# Patient Record
Sex: MALE | Race: WHITE | NOT HISPANIC OR LATINO | URBAN - METROPOLITAN AREA
[De-identification: names, ages, dates, MRNs, and addresses within clinical notes are randomized per-mention and may not be internally consistent; named-entity substitution may affect disease eponyms.]

---

## 2018-03-22 ENCOUNTER — INPATIENT (INPATIENT)
Facility: HOSPITAL | Age: 53
LOS: 1 days | Discharge: HOME | End: 2018-03-24
Attending: INTERNAL MEDICINE | Admitting: INTERNAL MEDICINE

## 2018-03-22 VITALS
TEMPERATURE: 99 F | DIASTOLIC BLOOD PRESSURE: 87 MMHG | OXYGEN SATURATION: 100 % | SYSTOLIC BLOOD PRESSURE: 147 MMHG | RESPIRATION RATE: 18 BRPM | HEART RATE: 87 BPM

## 2018-03-23 LAB
ALBUMIN SERPL ELPH-MCNC: 4.7 G/DL — SIGNIFICANT CHANGE UP (ref 3.5–5.2)
ALP SERPL-CCNC: 48 U/L — SIGNIFICANT CHANGE UP (ref 30–115)
ALT FLD-CCNC: 29 U/L — SIGNIFICANT CHANGE UP (ref 0–41)
ANION GAP SERPL CALC-SCNC: 12 MMOL/L — SIGNIFICANT CHANGE UP (ref 7–14)
ANION GAP SERPL CALC-SCNC: 19 MMOL/L — HIGH (ref 7–14)
APPEARANCE UR: CLEAR — SIGNIFICANT CHANGE UP
APTT BLD: 26.4 SEC — LOW (ref 27–39.2)
AST SERPL-CCNC: 20 U/L — SIGNIFICANT CHANGE UP (ref 0–41)
BASE EXCESS BLDV CALC-SCNC: 4 MMOL/L — HIGH (ref -2–2)
BASOPHILS # BLD AUTO: 0.01 K/UL — SIGNIFICANT CHANGE UP (ref 0–0.2)
BASOPHILS # BLD AUTO: 0.03 K/UL — SIGNIFICANT CHANGE UP (ref 0–0.2)
BASOPHILS NFR BLD AUTO: 0.1 % — SIGNIFICANT CHANGE UP (ref 0–1)
BASOPHILS NFR BLD AUTO: 0.1 % — SIGNIFICANT CHANGE UP (ref 0–1)
BILIRUB SERPL-MCNC: 0.9 MG/DL — SIGNIFICANT CHANGE UP (ref 0.2–1.2)
BILIRUB UR-MCNC: NEGATIVE — SIGNIFICANT CHANGE UP
BUN SERPL-MCNC: 18 MG/DL — SIGNIFICANT CHANGE UP (ref 10–20)
BUN SERPL-MCNC: 25 MG/DL — HIGH (ref 10–20)
CA-I SERPL-SCNC: 1.22 MMOL/L — SIGNIFICANT CHANGE UP (ref 1.12–1.3)
CALCIUM SERPL-MCNC: 8.5 MG/DL — SIGNIFICANT CHANGE UP (ref 8.5–10.1)
CALCIUM SERPL-MCNC: 9.1 MG/DL — SIGNIFICANT CHANGE UP (ref 8.5–10.1)
CHLORIDE SERPL-SCNC: 100 MMOL/L — SIGNIFICANT CHANGE UP (ref 98–110)
CHLORIDE SERPL-SCNC: 101 MMOL/L — SIGNIFICANT CHANGE UP (ref 98–110)
CK MB BLD-MCNC: 4 % — SIGNIFICANT CHANGE UP (ref 0–4)
CK MB CFR SERPL CALC: 1.8 NG/ML — SIGNIFICANT CHANGE UP (ref 0.6–6.3)
CK SERPL-CCNC: 48 U/L — SIGNIFICANT CHANGE UP (ref 0–225)
CO2 SERPL-SCNC: 24 MMOL/L — SIGNIFICANT CHANGE UP (ref 17–32)
CO2 SERPL-SCNC: 27 MMOL/L — SIGNIFICANT CHANGE UP (ref 17–32)
COLOR SPEC: YELLOW — SIGNIFICANT CHANGE UP
CREAT SERPL-MCNC: 1 MG/DL — SIGNIFICANT CHANGE UP (ref 0.7–1.5)
CREAT SERPL-MCNC: 1.1 MG/DL — SIGNIFICANT CHANGE UP (ref 0.7–1.5)
DIFF PNL FLD: NEGATIVE — SIGNIFICANT CHANGE UP
EOSINOPHIL # BLD AUTO: 0.08 K/UL — SIGNIFICANT CHANGE UP (ref 0–0.7)
EOSINOPHIL # BLD AUTO: 0.21 K/UL — SIGNIFICANT CHANGE UP (ref 0–0.7)
EOSINOPHIL NFR BLD AUTO: 0.4 % — SIGNIFICANT CHANGE UP (ref 0–8)
EOSINOPHIL NFR BLD AUTO: 1.4 % — SIGNIFICANT CHANGE UP (ref 0–8)
GAS PNL BLDV: 141 MMOL/L — SIGNIFICANT CHANGE UP (ref 136–145)
GAS PNL BLDV: SIGNIFICANT CHANGE UP
GLUCOSE SERPL-MCNC: 80 MG/DL — SIGNIFICANT CHANGE UP (ref 70–110)
GLUCOSE SERPL-MCNC: 96 MG/DL — SIGNIFICANT CHANGE UP (ref 70–99)
GLUCOSE UR QL: NEGATIVE — SIGNIFICANT CHANGE UP
HCO3 BLDV-SCNC: 30 MMOL/L — HIGH (ref 22–29)
HCT VFR BLD CALC: 39.3 % — LOW (ref 42–52)
HCT VFR BLD CALC: 41.9 % — LOW (ref 42–52)
HCT VFR BLDA CALC: 42.9 % — SIGNIFICANT CHANGE UP (ref 34–44)
HGB BLD CALC-MCNC: 14 G/DL — SIGNIFICANT CHANGE UP (ref 14–18)
HGB BLD-MCNC: 12.5 G/DL — LOW (ref 14–18)
HGB BLD-MCNC: 13.3 G/DL — LOW (ref 14–18)
IMM GRANULOCYTES NFR BLD AUTO: 0.6 % — HIGH (ref 0.1–0.3)
IMM GRANULOCYTES NFR BLD AUTO: 0.7 % — HIGH (ref 0.1–0.3)
INR BLD: 1.24 RATIO — SIGNIFICANT CHANGE UP (ref 0.65–1.3)
KETONES UR-MCNC: NEGATIVE — SIGNIFICANT CHANGE UP
LACTATE BLDV-MCNC: 0.8 MMOL/L — SIGNIFICANT CHANGE UP (ref 0.5–1.6)
LACTATE SERPL-SCNC: 0.8 MMOL/L — SIGNIFICANT CHANGE UP (ref 0.5–2.2)
LACTATE SERPL-SCNC: 1 MMOL/L — SIGNIFICANT CHANGE UP (ref 0.5–2.2)
LEUKOCYTE ESTERASE UR-ACNC: NEGATIVE — SIGNIFICANT CHANGE UP
LYMPHOCYTES # BLD AUTO: 1.6 K/UL — SIGNIFICANT CHANGE UP (ref 1.2–3.4)
LYMPHOCYTES # BLD AUTO: 1.77 K/UL — SIGNIFICANT CHANGE UP (ref 1.2–3.4)
LYMPHOCYTES # BLD AUTO: 10.4 % — LOW (ref 20.5–51.1)
LYMPHOCYTES # BLD AUTO: 7.9 % — LOW (ref 20.5–51.1)
MAGNESIUM SERPL-MCNC: 2 MG/DL — SIGNIFICANT CHANGE UP (ref 1.8–2.4)
MCHC RBC-ENTMCNC: 19.7 PG — LOW (ref 27–31)
MCHC RBC-ENTMCNC: 19.9 PG — LOW (ref 27–31)
MCHC RBC-ENTMCNC: 31.7 G/DL — LOW (ref 32–37)
MCHC RBC-ENTMCNC: 31.8 G/DL — LOW (ref 32–37)
MCV RBC AUTO: 62.1 FL — LOW (ref 80–94)
MCV RBC AUTO: 62.8 FL — LOW (ref 80–94)
MONOCYTES # BLD AUTO: 1.64 K/UL — HIGH (ref 0.1–0.6)
MONOCYTES # BLD AUTO: 1.77 K/UL — HIGH (ref 0.1–0.6)
MONOCYTES NFR BLD AUTO: 10.7 % — HIGH (ref 1.7–9.3)
MONOCYTES NFR BLD AUTO: 7.9 % — SIGNIFICANT CHANGE UP (ref 1.7–9.3)
NEUTROPHILS # BLD AUTO: 11.8 K/UL — HIGH (ref 1.4–6.5)
NEUTROPHILS # BLD AUTO: 18.72 K/UL — HIGH (ref 1.4–6.5)
NEUTROPHILS NFR BLD AUTO: 76.7 % — HIGH (ref 42.2–75.2)
NEUTROPHILS NFR BLD AUTO: 83.1 % — HIGH (ref 42.2–75.2)
NITRITE UR-MCNC: NEGATIVE — SIGNIFICANT CHANGE UP
NRBC # BLD: 0 /100 WBCS — SIGNIFICANT CHANGE UP (ref 0–0)
PCO2 BLDV: 53 MMHG — HIGH (ref 41–51)
PH BLDV: 7.37 — SIGNIFICANT CHANGE UP (ref 7.26–7.43)
PH UR: 6 — SIGNIFICANT CHANGE UP (ref 5–8)
PLATELET # BLD AUTO: 285 K/UL — SIGNIFICANT CHANGE UP (ref 130–400)
PLATELET # BLD AUTO: 320 K/UL — SIGNIFICANT CHANGE UP (ref 130–400)
PO2 BLDV: 18 MMHG — LOW (ref 20–40)
POTASSIUM BLDV-SCNC: 3.8 MMOL/L — SIGNIFICANT CHANGE UP (ref 3.3–5.6)
POTASSIUM SERPL-MCNC: 3.7 MMOL/L — SIGNIFICANT CHANGE UP (ref 3.5–5)
POTASSIUM SERPL-MCNC: 4.1 MMOL/L — SIGNIFICANT CHANGE UP (ref 3.5–5)
POTASSIUM SERPL-SCNC: 3.7 MMOL/L — SIGNIFICANT CHANGE UP (ref 3.5–5)
POTASSIUM SERPL-SCNC: 4.1 MMOL/L — SIGNIFICANT CHANGE UP (ref 3.5–5)
PROT SERPL-MCNC: 7.1 G/DL — SIGNIFICANT CHANGE UP (ref 6–8)
PROT UR-MCNC: NEGATIVE — SIGNIFICANT CHANGE UP
PROTHROM AB SERPL-ACNC: 13.5 SEC — HIGH (ref 9.95–12.87)
RBC # BLD: 6.33 M/UL — HIGH (ref 4.7–6.1)
RBC # BLD: 6.67 M/UL — HIGH (ref 4.7–6.1)
RBC # FLD: 17.5 % — HIGH (ref 11.5–14.5)
RBC # FLD: 18.6 % — HIGH (ref 11.5–14.5)
SAO2 % BLDV: 22 % — SIGNIFICANT CHANGE UP
SODIUM SERPL-SCNC: 140 MMOL/L — SIGNIFICANT CHANGE UP (ref 135–146)
SODIUM SERPL-SCNC: 143 MMOL/L — SIGNIFICANT CHANGE UP (ref 135–146)
SP GR SPEC: 1.01 — SIGNIFICANT CHANGE UP (ref 1.01–1.03)
TROPONIN T SERPL-MCNC: <0.01 NG/ML — SIGNIFICANT CHANGE UP
UROBILINOGEN FLD QL: 0.2 — SIGNIFICANT CHANGE UP (ref 0.2–0.2)
WBC # BLD: 15.36 K/UL — HIGH (ref 4.8–10.8)
WBC # BLD: 22.51 K/UL — HIGH (ref 4.8–10.8)
WBC # FLD AUTO: 15.36 K/UL — HIGH (ref 4.8–10.8)
WBC # FLD AUTO: 22.51 K/UL — HIGH (ref 4.8–10.8)

## 2018-03-23 RX ORDER — ACETAMINOPHEN 500 MG
650 TABLET ORAL EVERY 6 HOURS
Qty: 0 | Refills: 0 | Status: DISCONTINUED | OUTPATIENT
Start: 2018-03-23 | End: 2018-03-24

## 2018-03-23 RX ORDER — HYDROCORTISONE 1 %
1 OINTMENT (GRAM) TOPICAL
Qty: 0 | Refills: 0 | Status: DISCONTINUED | OUTPATIENT
Start: 2018-03-23 | End: 2018-03-24

## 2018-03-23 RX ORDER — SIMETHICONE 80 MG/1
80 TABLET, CHEWABLE ORAL EVERY 8 HOURS
Qty: 0 | Refills: 0 | Status: DISCONTINUED | OUTPATIENT
Start: 2018-03-23 | End: 2018-03-24

## 2018-03-23 RX ORDER — CIPROFLOXACIN LACTATE 400MG/40ML
400 VIAL (ML) INTRAVENOUS EVERY 12 HOURS
Qty: 0 | Refills: 0 | Status: DISCONTINUED | OUTPATIENT
Start: 2018-03-23 | End: 2018-03-24

## 2018-03-23 RX ORDER — METOPROLOL TARTRATE 50 MG
25 TABLET ORAL
Qty: 0 | Refills: 0 | Status: DISCONTINUED | OUTPATIENT
Start: 2018-03-23 | End: 2018-03-24

## 2018-03-23 RX ORDER — SIMETHICONE 80 MG/1
80 TABLET, CHEWABLE ORAL ONCE
Qty: 0 | Refills: 0 | Status: COMPLETED | OUTPATIENT
Start: 2018-03-23 | End: 2018-03-23

## 2018-03-23 RX ORDER — SODIUM CHLORIDE 9 MG/ML
1000 INJECTION INTRAMUSCULAR; INTRAVENOUS; SUBCUTANEOUS ONCE
Qty: 0 | Refills: 0 | Status: COMPLETED | OUTPATIENT
Start: 2018-03-23 | End: 2018-03-23

## 2018-03-23 RX ORDER — NEBIVOLOL HYDROCHLORIDE 5 MG/1
1 TABLET ORAL
Qty: 0 | Refills: 0 | COMMUNITY

## 2018-03-23 RX ORDER — ENOXAPARIN SODIUM 100 MG/ML
40 INJECTION SUBCUTANEOUS DAILY
Qty: 0 | Refills: 0 | Status: DISCONTINUED | OUTPATIENT
Start: 2018-03-23 | End: 2018-03-24

## 2018-03-23 RX ORDER — CIPROFLOXACIN LACTATE 400MG/40ML
400 VIAL (ML) INTRAVENOUS ONCE
Qty: 0 | Refills: 0 | Status: COMPLETED | OUTPATIENT
Start: 2018-03-23 | End: 2018-03-23

## 2018-03-23 RX ORDER — METRONIDAZOLE 500 MG
500 TABLET ORAL ONCE
Qty: 0 | Refills: 0 | Status: COMPLETED | OUTPATIENT
Start: 2018-03-23 | End: 2018-03-23

## 2018-03-23 RX ORDER — METRONIDAZOLE 500 MG
500 TABLET ORAL EVERY 8 HOURS
Qty: 0 | Refills: 0 | Status: DISCONTINUED | OUTPATIENT
Start: 2018-03-23 | End: 2018-03-24

## 2018-03-23 RX ADMIN — Medication 650 MILLIGRAM(S): at 13:49

## 2018-03-23 RX ADMIN — SIMETHICONE 80 MILLIGRAM(S): 80 TABLET, CHEWABLE ORAL at 04:40

## 2018-03-23 RX ADMIN — SIMETHICONE 80 MILLIGRAM(S): 80 TABLET, CHEWABLE ORAL at 11:10

## 2018-03-23 RX ADMIN — Medication 25 MILLIGRAM(S): at 17:23

## 2018-03-23 RX ADMIN — SODIUM CHLORIDE 1000 MILLILITER(S): 9 INJECTION INTRAMUSCULAR; INTRAVENOUS; SUBCUTANEOUS at 00:20

## 2018-03-23 RX ADMIN — Medication 100 MILLIGRAM(S): at 22:10

## 2018-03-23 RX ADMIN — SIMETHICONE 80 MILLIGRAM(S): 80 TABLET, CHEWABLE ORAL at 22:40

## 2018-03-23 RX ADMIN — Medication 200 MILLIGRAM(S): at 04:25

## 2018-03-23 RX ADMIN — Medication 100 MILLIGRAM(S): at 13:39

## 2018-03-23 RX ADMIN — Medication 200 MILLIGRAM(S): at 17:23

## 2018-03-23 RX ADMIN — Medication 1 APPLICATION(S): at 17:20

## 2018-03-23 RX ADMIN — Medication 100 MILLIGRAM(S): at 05:55

## 2018-03-23 RX ADMIN — ENOXAPARIN SODIUM 40 MILLIGRAM(S): 100 INJECTION SUBCUTANEOUS at 12:26

## 2018-03-23 RX ADMIN — SODIUM CHLORIDE 1000 MILLILITER(S): 9 INJECTION INTRAMUSCULAR; INTRAVENOUS; SUBCUTANEOUS at 02:55

## 2018-03-23 NOTE — CONSULT NOTE ADULT - ASSESSMENT
HTN- well controlled  acute uncomplicated diverticulitis  leukocytosis from steroids? vs diverticulitis    plan:    admit to dr ramirez  ivf  cipro / flagyl  off prednisone  cont clears and advance as tolerated  may resume bystolic  GI eval  dc home with po abx once tolerating reg diet (about 24-48 hours)

## 2018-03-23 NOTE — H&P ADULT - NSHPPHYSICALEXAM_GEN_ALL_CORE
T(C): 37 (03-22-18 @ 22:57), Max: 37 (03-22-18 @ 22:57)  HR: 87 (03-22-18 @ 22:57) (87 - 87)  BP: 147/87 (03-22-18 @ 22:57) (147/87 - 147/87)  RR: 18 (03-22-18 @ 22:57) (18 - 18)  SpO2: 100% (03-22-18 @ 22:57) (100% - 100%)    PHYSICAL EXAM:  GENERAL: NAD, well-developed  HEAD:  Atraumatic, Normocephalic  EYES: EOMI, PERRLA, conjunctiva and sclera clear  NECK: Supple, No JVD  CHEST/LUNG: Clear to auscultation bilaterally; No wheeze  HEART: Regular rate and rhythm; No murmurs, rubs, or gallops  ABDOMEN: Soft, LLQ tender to light palpation, no guarding/rebound, nondistended; Bowel sounds present  EXTREMITIES:  2+ Peripheral Pulses, No clubbing, cyanosis, or edema  PSYCH: AAOx3  NEUROLOGY: non-focal  SKIN: No rashes or lesions

## 2018-03-23 NOTE — H&P ADULT - NSHPLABSRESULTS_GEN_ALL_CORE
Labs:                         13.3<L>  22.51<H>   )-----------(   320      ( 23 Mar 2018 00:05 )              41.9<L>    Neutro%  83.1<H>   Lympho%  7.9<L>   Mono%    7.9     Bands    x            140  |  101  |  25<H>  ----------------------------<  80  4.1   |  27  |  1.1    Ca    9.1      23 Mar 2018 00:05    TPro  7.1  /  Alb  4.7  /  TBili  0.9  /  DBili  x   /  AST  20  /  ALT  29  /  AlkPhos  48      PT/INR - ( 23 Mar 2018 00:05 )   PT: 13.50 sec;   INR: 1.24 ratio    PTT - ( 23 Mar 2018 00:05 )  PTT:26.4 sec    CARDIAC MARKERS ( 23 Mar 2018 00:05 )  x     / <0.01 ng/mL / 48 U/L / x     / 1.8 ng/mL      LIVER FUNCTIONS - ( 23 Mar 2018 00:05 )  Alb: 4.7 g/dL / Pro: 7.1 g/dL / ALK PHOS: 48 U/L / ALT: 29 U/L / AST: 20 U/L / GGT: x           Lactate, Blood: 1.0 mmol/L (18 @ 02:16)    Urinalysis Basic - ( 23 Mar 2018 00:05 )  Color: Yellow / Appearance: Clear / S.015 / pH: x  Gluc: x / Ketone: Negative  / Bili: Negative / Urobili: 0.2   Blood: x / Protein: Negative / Nitrite: Negative   Leuk Esterase: Negative / RBC: x / WBC x   Sq Epi: x / Non Sq Epi: x / Bacteria: x    < from: CT Abdomen and Pelvis w/ IV Cont (18 @ 02:46) >    Acute uncomplicated sigmoid diverticulitis.    Splenomegaly, 14.6 cm craniocaudal dimension.    Normal appendix.

## 2018-03-23 NOTE — H&P ADULT - ASSESSMENT
Patient is a 52y old  Male who presents with a chief complaint of LLQ pain found to have uncomplicated diverticulitis  PAST MEDICAL & SURGICAL HISTORY:  Hypertension  No significant past surgical history    Plan:  -iv cipro flagyl till pt tolerates diet  -start with clear liquid diet and advance as tolerated  -monitor closely for worsening pain, unstable vital signs, guarding  -general surgery eval if suspect perforation/complication  -f/u GI outpatient for colonoscopy in 6-8 weeks  -outpatient f/u for incidental finding of splenomegaly  -continue metoprolol instead of bystolic  -lovenox for DVT ppx

## 2018-03-23 NOTE — H&P ADULT - HISTORY OF PRESENT ILLNESS
51 yo M with PMHx of HTN on Bystolic, with complaints of 6 hours of non radiating periumbilical and LLQ abd pain not a/w with nausea/vomiting/diarrhea/eating/movement. Pt had 1 normal BM after onset of pain.  Pt denies fever/chills, chest pain, shortness of breath, headache, diaphoresis, weight loss/gain, LE pain/numbness/parasthesias,  sxs.

## 2018-03-23 NOTE — ED ADULT NURSE NOTE - CHPI ED SYMPTOMS NEG
no abdominal distension/no dysuria/no nausea/no blood in stool/no hematuria/no vomiting/no fever/no diarrhea/no chills/no burning urination

## 2018-03-23 NOTE — CONSULT NOTE ADULT - ATTENDING COMMENTS
agree with above plan but patient was discharged before seen by me/patient was informed by staff to follow up with outpatient gi and if fever or pain worsen return to hospital.

## 2018-03-23 NOTE — ED PROVIDER NOTE - PHYSICAL EXAMINATION
CONSTITUTIONAL: Well-developed; well-nourished; in no acute distress, nontoxic appearing  SKIN: skin exam is warm and dry,  HEAD: Normocephalic; atraumatic.  EYES: PERRL, 3 mm bilateral, no nystagmus, EOM intact; conjunctiva and sclera clear.  ENT: MMM, no nasal congestion  NECK: Supple; non tender.+ full passive ROM in all directions. No JVD  CARD: S1, S2 normal, no murmur  RESP: No wheezes, rales or rhonchi. Good air movement bilaterally  ABD: soft; non-distended; + RLQ ttp w/o guarding or CVAT. No Rebound, No guarding  EXT: Normal ROM. No cyanosis or edema. DP Pulses intact.   NEURO: awake, alert, following commands, oriented, grossly unremarkable. No Focal deficits. GCS 15.   PSYCH: Cooperative, appropriate.

## 2018-03-23 NOTE — PROGRESS NOTE ADULT - SUBJECTIVE AND OBJECTIVE BOX
Medicine admit note    Patient was seen and examined. Spoke with RN and housestaff. Chart reviewed.  No events overnight.  Vital Signs Last 24 Hrs  T(F): 98.6 (22 Mar 2018 22:57), Max: 98.6 (22 Mar 2018 22:57)  HR: 87 (22 Mar 2018 22:57) (87 - 87)  BP: 147/87 (22 Mar 2018 22:57) (147/87 - 147/87)  SpO2: 100% (22 Mar 2018 22:57) (100% - 100%)  MEDICATIONS  (STANDING):    MEDICATIONS  (PRN):    Labs:                        13.3   22.51 )-----------( 320      ( 23 Mar 2018 00:05 )             41.9     23 Mar 2018 00:05    140    |  101    |  25     ----------------------------<  80     4.1     |  27     |  1.1      Ca    9.1        23 Mar 2018 00:05    TPro  7.1    /  Alb  4.7    /  TBili  0.9    /  DBili  x      /  AST  20     /  ALT  29     /  AlkPhos  48     23 Mar 2018 00:05    PT/INR - ( 23 Mar 2018 00:05 )   PT: 13.50 sec;   INR: 1.24 ratio         PTT - ( 23 Mar 2018 00:05 )  PTT:26.4 sec  Urinalysis Basic - ( 23 Mar 2018 00:05 )    Color: Yellow / Appearance: Clear / S.015 / pH: x  Gluc: x / Ketone: Negative  / Bili: Negative / Urobili: 0.2   Blood: x / Protein: Negative / Nitrite: Negative   Leuk Esterase: Negative / RBC: x / WBC x   Sq Epi: x / Non Sq Epi: x / Bacteria: x      CT: acute uncomplicated sigmoid diverticulitis      General: comfortable, NAD  Neurology: A&Ox3, nonfocal  Head:  Normocephalic, atraumatic  ENT:  Mucosa moist, no ulcerations  Neck:  Supple, no JVD,   Skin: no breakdowns (as per RN)  Resp: CTA B/L  CV: RRR, S1S2,   GI: Soft, RLQ tenderness, no rebound, positive bowel sounds  MS: No edema, + peripheral pulses, FROM all 4 extremity      A/P:  51 yo man with RLQ abdominal pain  No fever, no bleeding, no N/VD    IV cipro/flagyl  analgesia  surgery eval if worsens  liquid diet    splenomegaly- follow with PMD Dr Ubaldo MCKEON as outpt  DVT prophylaxis  Decubitus prevention- all measures as per RN protocol  Please call or text me with any questions or updates

## 2018-03-23 NOTE — CONSULT NOTE ADULT - ASSESSMENT
53 yo M with PMHx of HTN on Bystolic, admitted with complain of non radiating periumbilical and LLQ abd pain  and was found to have acute sigmoid uncomplicated diverticulitis.    Acute Sigmoid Uncomplicated Diverticulitis :  - Can start on low residue diet.  - Continue IV cipro and flagyl for now.  - Can change to PO on discharge for total of 14 days.  - High WBC likely secondary to steroids.  - Pt needs outpatient colonoscopy in 8 weeks.  - Risks and benefits discussed with the patient and he wants to follow up with Dr Brody in [place].  - Recall GI as needed.

## 2018-03-23 NOTE — CONSULT NOTE ADULT - SUBJECTIVE AND OBJECTIVE BOX
NEPHROLOGY CONSULTATION NOTE    53 yo wm with pmh of htn on bystolic, recently placed on po sterods for rash, then developed hiccups with abd pain.  No fever.  Ct abd shows diverticulitis in ED.  Steroids dced and abx given.  Pt now tolerating clears.  BP's well controlled on bystolic.  No n/v/fever/diarrhea.  Now with less abd pain.    PAST MEDICAL & SURGICAL HISTORY:  Hypertension  No significant past surgical history    Allergies:  No Known Allergies    Home Medications Reviewed    SOCIAL HISTORY:  Denies ETOH,Smoking,   FAMILY HISTORY:  No pertinent family history in first degree relatives        REVIEW OF SYSTEMS:  CONSTITUTIONAL: No weakness, fevers or chills  EYES/ENT: No visual changes;  No vertigo or throat pain   NECK: No pain or stiffness  RESPIRATORY: No cough, wheezing, hemoptysis; No shortness of breath  CARDIOVASCULAR: No chest pain or palpitations.  GASTROINTESTINAL: as above  GENITOURINARY: No dysuria, frequency, foamy urine, urinary urgency, incontinence or hematuria  NEUROLOGICAL: No numbness or weakness  SKIN: No itching, burning, rashes, or lesions   VASCULAR: No bilateral lower extremity edema.   All other review of systems is negative unless indicated above.    PHYSICAL EXAM:  NAD  Awake and Alert  moist mm   cta bl  'rrr  soft, mild rlq tendreness, + BS  no cvat  no cce  no rash    Hospital Medications:   MEDICATIONS  (STANDING):  ciprofloxacin   IVPB 400 milliGRAM(s) IV Intermittent every 12 hours  enoxaparin Injectable 40 milliGRAM(s) SubCutaneous daily  metoprolol tartrate 25 milliGRAM(s) Oral two times a day  metroNIDAZOLE  IVPB 500 milliGRAM(s) IV Intermittent every 8 hours        VITALS:  T(F): 98.8 (18 @ 09:19), Max: 99.5 (18 @ 08:27)  HR: 89 (18 @ 09:19)  BP: 119/68 (18 @ 09:19)  RR: 18 (18 @ 09:19)  SpO2: 98% (18 @ 08:27)  Wt(kg): --    Height (cm): 187.96 ( 09:19)  Weight (kg): 97.5 (:19)  BMI (kg/m2): 27.6 (03-23 @ 09:19)  BSA (m2): 2.24 ( @ 09:19)    LABS:      140  |  101  |  25<H>  ----------------------------<  80  4.1   |  27  |  1.1    Ca    9.1      23 Mar 2018 00:05    TPro  7.1  /  Alb  4.7  /  TBili  0.9  /  DBili      /  AST  20  /  ALT  29  /  AlkPhos  48                            13.3   22.51 )-----------( 320      ( 23 Mar 2018 00:05 )             41.9       Urine Studies:  Urinalysis Basic - ( 23 Mar 2018 00:05 )    Color: Yellow / Appearance: Clear / S.015 / pH:   Gluc:  / Ketone: Negative  / Bili: Negative / Urobili: 0.2   Blood:  / Protein: Negative / Nitrite: Negative   Leuk Esterase: Negative / RBC:  / WBC    Sq Epi:  / Non Sq Epi:  / Bacteria:           RADIOLOGY & ADDITIONAL STUDIES:

## 2018-03-23 NOTE — ED PROVIDER NOTE - NS ED ROS FT
Constitutional:  no fevers, no chills, no malaise  Eyes:  No visual changes  ENMT: No neck pain or stiffness, no nasal congestion, no ear pain, no throat pain  Cardiac:  No chest pain, + hx of HTN  Respiratory:  No cough or sob  GI:  Ad per HPI  :  No dysuria, frequency or burning.  MS:  No back pain, no joint pain.  Neuro:  No headache, no dizziness, no change in mental status  Skin:  No skin rash

## 2018-03-23 NOTE — CONSULT NOTE ADULT - SUBJECTIVE AND OBJECTIVE BOX
Chief Complaint:  Patient is a 52y old  Male who presents with a chief complaint of LLQ abdominal pain x 1 day (23 Mar 2018 08:17)    HPI: 51 yo M with PMHx of HTN on [facility], admitted with complain of non radiating periumbilical and LLQ abd pain not a/w with nausea/vomiting/diarrhea/eating/movement. Pt had 1 normal BM after onset of pain.  Pt denies fever/chills,  and was recently on prednisone for skin rash and was found to have acute sigmoid uncomplicated diverticulitis and WBC of 22,000.  Pt never had colonoscopy and no family H/o colon CA. Never had diverticulitis in the past.    Home Medications: [facility] Medications:  acetaminophen   Tablet 650 milliGRAM(s) Oral every 6 hours PRN  ciprofloxacin   IVPB 400 milliGRAM(s) IV Intermittent every 12 hours  enoxaparin Injectable 40 milliGRAM(s) SubCutaneous daily  hydrocortisone 1% Cream 1 Application(s) Topical two times a day  metoprolol tartrate 25 milliGRAM(s) Oral two times a day  metroNIDAZOLE  IVPB 500 milliGRAM(s) IV Intermittent every 8 hours  simethicone 80 milliGRAM(s) Chew every 8 hours PRN      PMHX/PSHX:  Hypertension  No significant past surgical history      Family history:  No pertinent family history in first degree relatives      ROS:     General:  No wt loss, fevers, chills, night sweats, fatigue,   Eyes:  Good vision, no reported pain  ENT:  No sore throat, pain, runny nose, dysphagia  CV:  No pain, palpitations, hypo/hypertension  Resp:  No dyspnea, cough, tachypnea, wheezing  GI:  See HPI  :  No pain, bleeding, incontinence, nocturia  Muscle:  No pain, weakness  Neuro:  No weakness, tingling, memory problems  Skin:  No rash, edema      PHYSICAL EXAM:     GENERAL:  Appears stated age, well-groomed, well-nourished, no distress  HEENT:  NC/AT,  conjunctivae clear and pink,  no JVD  CHEST:  Full & symmetric excursion, no increased effort, breath sounds clear  HEART:  Regular rhythm, S1, S2, no added sounds  ABDOMEN:  Soft, non-tender, non-distended, normoactive bowel sounds,  no masses ,  EXTREMITIES:  no cyanosis, clubbing or edema  SKIN:  No rash/erythema/ecchymoses/petechiae/wounds/abscess/warm/dry  NEURO:  Alert, oriented, non focal    Vital Signs:  Vital Signs Last 24 Hrs  T(C): 37.4 (23 Mar 2018 13:00), Max: 37.5 (23 Mar 2018 08:27)  T(F): 99.3 (23 Mar 2018 13:00), Max: 99.5 (23 Mar 2018 08:27)  HR: 92 (23 Mar 2018 13:00) (85 - 92)  BP: 126/78 (23 Mar 2018 13:00) (118/62 - 147/87)  BP(mean): --  RR: 18 (23 Mar 2018 13:00) (18 - 18)  SpO2: 98% (23 Mar 2018 08:27) (98% - 100%)  Daily Height in cm: 187.96 (23 Mar 2018 09:19)    Daily     LABS:                        13.3   22.51 )-----------( 320      ( 23 Mar 2018 00:05 )             41.9         140  |  101  |  25<H>  ----------------------------<  80  4.1   |  27  |  1.1    Ca    9.1      23 Mar 2018 00:05    TPro  7.1  /  Alb  4.7  /  TBili  0.9  /  DBili  x   /  AST  20  /  ALT  29  /  AlkPhos  48      LIVER FUNCTIONS - ( 23 Mar 2018 00:05 )  Alb: 4.7 g/dL / Pro: 7.1 g/dL / ALK PHOS: 48 U/L / ALT: 29 U/L / AST: 20 U/L / GGT: x           PT/INR - ( 23 Mar 2018 00:05 )   PT: 13.50 sec;   INR: 1.24 ratio         PTT - ( 23 Mar 2018 00:05 )  PTT:26.4 sec  Urinalysis Basic - ( 23 Mar 2018 00:05 )    Color: Yellow / Appearance: Clear / S.015 / pH: x  Gluc: x / Ketone: Negative  / Bili: Negative / Urobili: 0.2   Blood: x / Protein: Negative / Nitrite: Negative   Leuk Esterase: Negative / RBC: x / WBC x   Sq Epi: x / Non Sq Epi: x / Bacteria: x          Imaging: < from: CT Abdomen and Pelvis w/ IV Cont (18 @ 02:46) >    Acute uncomplicated sigmoid diverticulitis.    Splenomegaly, 14.6 cm craniocaudal dimension.    Normal appendix.      < end of copied text >

## 2018-03-24 VITALS — OXYGEN SATURATION: 99 %

## 2018-03-24 LAB
CULTURE RESULTS: SIGNIFICANT CHANGE UP
SPECIMEN SOURCE: SIGNIFICANT CHANGE UP

## 2018-03-24 RX ORDER — METRONIDAZOLE 500 MG
1 TABLET ORAL
Qty: 42 | Refills: 0 | OUTPATIENT
Start: 2018-03-24 | End: 2018-04-06

## 2018-03-24 RX ORDER — CIPROFLOXACIN LACTATE 400MG/40ML
1 VIAL (ML) INTRAVENOUS
Qty: 28 | Refills: 0 | OUTPATIENT
Start: 2018-03-24 | End: 2018-04-06

## 2018-03-24 RX ADMIN — Medication 100 MILLIGRAM(S): at 05:44

## 2018-03-24 RX ADMIN — Medication 25 MILLIGRAM(S): at 05:44

## 2018-03-24 RX ADMIN — Medication 200 MILLIGRAM(S): at 05:44

## 2018-03-24 NOTE — DISCHARGE NOTE ADULT - PROVIDER TOKENS
FREE:[LAST:[Sharlene],FIRST:[Derek],PHONE:[phone]],FAX:[(   )    -],ADDRESS:[[address] [place] [address]]]

## 2018-03-24 NOTE — DISCHARGE NOTE ADULT - MEDICATION SUMMARY - MEDICATIONS TO TAKE
I will START or STAY ON the medications listed below when I get home from the hospital:    metroNIDAZOLE 500 mg oral tablet  -- 1 tab(s) by mouth every 8 hours   -- Do not drink alcoholic beverages when taking this medication.  Finish all this medication unless otherwise directed by prescriber.  May discolor urine or feces.    -- Indication: For DIVERTICULITIS    Bystolic 5 mg oral tablet  -- 1 tab(s) by mouth once a day  -- Indication: For Hypertension    ciprofloxacin 500 mg oral tablet  -- 1 tab(s) by mouth every 12 hours   -- Avoid prolonged or excessive exposure to direct and/or artificial sunlight while taking this medication.  Check with your doctor before becoming pregnant.  Do not take dairy products, antacids, or iron preparations within one hour of this medication.  Finish all this medication unless otherwise directed by prescriber.  Medication should be taken with plenty of water.    -- Indication: For DIVERTICULITIS

## 2018-03-24 NOTE — DISCHARGE NOTE ADULT - NS MD DC PLAN IMMU FLU PROVIDE INFO
Vaccine Information Sheet (VIS) provided-VIS date: 8/07/15/Risks/benefits discussed with patient or patient surrogate
detailed exam

## 2018-03-24 NOTE — DISCHARGE NOTE ADULT - HOSPITAL COURSE
51 yo M with PMHx of HTN on Bystolic, admitted with complain of non radiating periumbilical and LLQ abd pain not a/w with nausea/vomiting/diarrhea/eating/movement. Pt had 1 normal BM after onset of pain.  Pt denies fever/chills,  and was recently on prednisone for skin rash and was found to have acute sigmoid uncomplicated diverticulitis and WBC of 22,000.  Pt never had colonoscopy and no family H/o colon CA. Never had diverticulitis in the past.  CT scan showed diverticulitis, started on IV ciprofloxacin and metronidazole. Currently tolerating regular diet. Discharged on oral antibiotics and follow up with gastroenterologist in 6-8 weeks for colonoscopy.

## 2018-03-24 NOTE — DISCHARGE NOTE ADULT - CARE PLAN
Principal Discharge DX:	Diverticulitis  Goal:	treat and follow up  Assessment and plan of treatment:	Take your medications as instructed. You have been prescribed Ciprofloxacin and Metronidazole for 14 days as has been recommended by Gastroenterology. Follow up with you Gastroenterologist in 6-8 weeks for a colonoscopy once the inflammation around the colon has resolved. Follow up with your Gastroenterologist regarding your incidental finding of Splenomegaly on CT scan in the setting of mild anemia.  Secondary Diagnosis:	Hypertension  Goal:	treat and monitor  Assessment and plan of treatment:	Follow up with your Primary Care Doctor for monitoring. You have been seen by Nephrology who recommended to resume the Bystolic (Nebivolol).

## 2018-03-24 NOTE — DISCHARGE NOTE ADULT - PLAN OF CARE
treat and follow up Take your medications as instructed. You have been prescribed Ciprofloxacin and Metronidazole for 14 days as has been recommended by Gastroenterology. Follow up with you Gastroenterologist in 6-8 weeks for a colonoscopy once the inflammation around the colon has resolved. Follow up with your Gastroenterologist regarding your incidental finding of Splenomegaly on CT scan in the setting of mild anemia. treat and monitor Follow up with your Primary Care Doctor for monitoring. You have been seen by Nephrology who recommended to resume the Bystolic (Nebivolol).

## 2018-03-24 NOTE — DISCHARGE NOTE ADULT - PATIENT PORTAL LINK FT
You can access the [facility] Patient Portal, offered by [facility], by registering with the following website: [email or web address]

## 2018-03-24 NOTE — DISCHARGE NOTE ADULT - OTHER SIGNIFICANT FINDINGS
EXAM:  CT ABDOMEN AND PELVIS IC            PROCEDURE DATE:  03/23/2018            INTERPRETATION:  CLINICAL STATEMENT: Right lower quadrant pain.      TECHNIQUE: Contiguous axial CT images were obtained from the lower chest   to the pubic symphysis with intravenous contrast.  Oral contrast was not   administered.  Reformatted images in the coronal and sagittal planes were   acquired.    COMPARISON CT: None.    OTHER STUDIES USED FOR CORRELATION: None.       FINDINGS:    LOWER CHEST: Unremarkable.    HEPATOBILIARY: Unremarkable.    SPLEEN: Splenomegaly, 14.6 cm craniocaudal dimension.    PANCREAS: Unremarkable.    ADRENAL GLANDS: Unremarkable.    KIDNEYS: Right renal 4.2 cm cyst. No hydronephrosis.    ABDOMINOPELVIC NODES: Unremarkable.    PELVIC ORGANS: Unremarkable.    PERITONEUM/MESENTERY/BOWEL: Normal appendix. There is left colonic   diverticulosis. There is inflammation surrounding a short portion of   sigmoid colon, consistent with diverticulitis. No pericolonic abscess. No   free air.    BONES/SOFT TISSUES: Unremarkable.    OTHER:          IMPRESSION:        Acute uncomplicated sigmoid diverticulitis.    Splenomegaly, 14.6 cm craniocaudal dimension.    Normal appendix.

## 2018-03-26 DIAGNOSIS — K57.32 DIVERTICULITIS OF LARGE INTESTINE WITHOUT PERFORATION OR ABSCESS WITHOUT BLEEDING: ICD-10-CM

## 2018-03-26 DIAGNOSIS — I10 ESSENTIAL (PRIMARY) HYPERTENSION: ICD-10-CM

## 2018-03-26 DIAGNOSIS — R10.9 UNSPECIFIED ABDOMINAL PAIN: ICD-10-CM

## 2018-03-26 DIAGNOSIS — Z79.52 LONG TERM (CURRENT) USE OF SYSTEMIC STEROIDS: ICD-10-CM

## 2021-10-01 NOTE — PATIENT PROFILE ADULT. - CAREGIVER
[Coronary Artery Disease] : coronary artery disease [No Pertinent Pulmonary History] : no history of asthma, COPD, sleep apnea, or smoking [No Adverse Anesthesia Reaction] : no adverse anesthesia reaction in self or family member [(Patient denies any chest pain, claudication, dyspnea on exertion, orthopnea, palpitations or syncope)] : Patient denies any chest pain, claudication, dyspnea on exertion, orthopnea, palpitations or syncope [Aortic Stenosis] : no aortic stenosis [Atrial Fibrillation] : no atrial fibrillation [Recent Myocardial Infarction] : no recent myocardial infarction [Implantable Device/Pacemaker] : no implantable device/pacemaker [Chronic Anticoagulation] : no chronic anticoagulation [Chronic Kidney Disease] : no chronic kidney disease [Diabetes] : no diabetes [FreeTextEntry1] : Micro harvey laminectomy [FreeTextEntry2] : October 28, 2021 [FreeTextEntry3] : Dr Meza No

## 2022-10-17 PROBLEM — I10 ESSENTIAL (PRIMARY) HYPERTENSION: Chronic | Status: ACTIVE | Noted: 2018-03-23

## 2022-10-24 PROBLEM — Z00.00 ENCOUNTER FOR PREVENTIVE HEALTH EXAMINATION: Status: ACTIVE | Noted: 2022-10-24

## 2022-10-31 ENCOUNTER — APPOINTMENT (OUTPATIENT)
Dept: UROLOGY | Facility: CLINIC | Age: 57
End: 2022-10-31

## 2022-10-31 VITALS
SYSTOLIC BLOOD PRESSURE: 142 MMHG | BODY MASS INDEX: 27.98 KG/M2 | WEIGHT: 218 LBS | HEIGHT: 74 IN | HEART RATE: 67 BPM | DIASTOLIC BLOOD PRESSURE: 94 MMHG | RESPIRATION RATE: 18 BRPM | OXYGEN SATURATION: 98 %

## 2022-10-31 DIAGNOSIS — N53.19 OTHER EJACULATORY DYSFUNCTION: ICD-10-CM

## 2022-10-31 DIAGNOSIS — F41.9 ANXIETY DISORDER, UNSPECIFIED: ICD-10-CM

## 2022-10-31 DIAGNOSIS — N45.1 EPIDIDYMITIS: ICD-10-CM

## 2022-10-31 DIAGNOSIS — I10 ESSENTIAL (PRIMARY) HYPERTENSION: ICD-10-CM

## 2022-10-31 DIAGNOSIS — Z80.8 FAMILY HISTORY OF MALIGNANT NEOPLASM OF OTHER ORGANS OR SYSTEMS: ICD-10-CM

## 2022-10-31 PROCEDURE — 99203 OFFICE O/P NEW LOW 30 MIN: CPT

## 2022-10-31 RX ORDER — VILAZODONE HYDROCHLORIDE 20 MG/1
20 TABLET, FILM COATED ORAL
Qty: 30 | Refills: 0 | Status: ACTIVE | COMMUNITY
Start: 2022-06-22

## 2022-10-31 RX ORDER — NEBIVOLOL 5 MG/1
5 TABLET ORAL
Qty: 90 | Refills: 0 | Status: ACTIVE | COMMUNITY
Start: 2022-06-15

## 2022-10-31 RX ORDER — SILDENAFIL 100 MG/1
100 TABLET, FILM COATED ORAL
Qty: 10 | Refills: 0 | Status: ACTIVE | COMMUNITY
Start: 2021-08-21

## 2022-10-31 RX ORDER — VILAZODONE HYDROCHLORIDE 40 MG/1
40 TABLET, FILM COATED ORAL
Qty: 30 | Refills: 0 | Status: ACTIVE | COMMUNITY
Start: 2022-09-30

## 2022-10-31 RX ORDER — LOSARTAN POTASSIUM 100 MG/1
100 TABLET, FILM COATED ORAL
Qty: 90 | Refills: 0 | Status: ACTIVE | COMMUNITY
Start: 2022-09-20

## 2022-10-31 RX ORDER — CHLORTHALIDONE 25 MG/1
25 TABLET ORAL
Qty: 90 | Refills: 0 | Status: ACTIVE | COMMUNITY
Start: 2022-05-18

## 2022-10-31 RX ORDER — SERTRALINE HYDROCHLORIDE 50 MG/1
50 TABLET, FILM COATED ORAL
Qty: 30 | Refills: 0 | Status: ACTIVE | COMMUNITY
Start: 2022-05-10

## 2022-10-31 RX ORDER — NITROFURANTOIN (MONOHYDRATE/MACROCRYSTALS) 25; 75 MG/1; MG/1
100 CAPSULE ORAL
Qty: 14 | Refills: 0 | Status: ACTIVE | COMMUNITY
Start: 2022-05-09

## 2022-12-01 RX ORDER — CABERGOLINE 0.5 MG/1
0.5 TABLET ORAL
Qty: 24 | Refills: 0 | Status: ACTIVE | COMMUNITY
Start: 2022-12-01 | End: 1900-01-01

## 2022-12-01 NOTE — ADDENDUM
[FreeTextEntry1] : Dec 1 2022 -- spoke to pt on phon he stopped vilazodone \par no heart valve issues\par BP is controlled \par \par we discussed off label use of medication to treat anorgasmia\par \par I will prescribe Cabergoline 0.5mg for 3 months and reassess in 2 months\par he knows that this is off label and may not work or it can take up to 3-6 months-- in which case we can try other medications\par \par aware of risk of mitral valve problems and willing to proceed

## 2022-12-01 NOTE — LETTER BODY
[Dear  ___] : Dear  [unfilled], [Consult Letter:] : I had the pleasure of evaluating your patient, [unfilled]. [Please see my note below.] : Please see my note below. [Consult Closing:] : Thank you very much for allowing me to participate in the care of this patient.  If you have any questions, please do not hesitate to contact me. [Sincerely,] : Sincerely, [FreeTextEntry3] : Josse España MD\par Director of Male Sexual Dysfunction and Urologic Prosthetics

## 2022-12-01 NOTE — ASSESSMENT
[FreeTextEntry1] : This is a 57 year male who presents with decreased orgasm/ejaculation\par \par May 2022 - sepsis UTI\par was on cipro PO for three days\par \par Pt reports loss of sensitivity/numbness worse after had sepsis UTI in May 222\par hospitalized for Sepsis/ new diagnosis of epididymitis\par \par normal erections\par \par no other issues with sensations to feet or hands\par \par On testosterone replacement with doctor in [place] for low drive\par currently 1050 \par no injury during sex \par no stretching devices or constrictions rings \par no history of diabetes\par started vilazodone for anxiety around the same time \par \par unable to reach orgasm consistently\par last time a week ago\par \par same partner for 5 years \par \par PE - normal penis, testes without tenderness -- right testes retracted - easily milked down - no masses bilaterally

## 2022-12-01 NOTE — HISTORY OF PRESENT ILLNESS
[FreeTextEntry1] : This is a 57 year male who presents with decreased orgasm/ejaculation\par \par May 2022 - sepsis UTI\par was on cipro PO for three days\par \par Pt reports loss of sensitivity/numbness worse after had sepsis UTI in May 222\par hospitalized for Sepsis/ new diagnosis of epididymitis\par \par normal erections\par \par no other issues with sensations to feet or hands\par \par On testosterone replacement with doctor in [place] for low drive\par currently 1050 \par no injury during sex \par no stretching devices or constrictions rings \par no history of diabetes\par started vilazodone for anxiety around the same time \par \par unable to reach orgasm consistently\par last time a week ago\par \par same partner for 5 years \par \par PE - normal penis, testes without tenderness -- right testes retracted - easily milked down - no masses bilaterally \par \par

## 2023-02-23 ENCOUNTER — APPOINTMENT (OUTPATIENT)
Dept: UROLOGY | Facility: CLINIC | Age: 58
End: 2023-02-23
Payer: COMMERCIAL

## 2023-02-23 DIAGNOSIS — F52.32 MALE ORGASMIC DISORDER: ICD-10-CM

## 2023-02-23 PROCEDURE — 99441: CPT

## 2023-02-23 NOTE — ASSESSMENT
[FreeTextEntry1] : This is a 57 year male who presents with decreased orgasm/ejaculation\par \par May 2022 - sepsis UTI\par was on cipro PO for three days\par \par Pt reports loss of sensitivity/numbness worse after had sepsis UTI in May 222\par hospitalized for Sepsis/ new diagnosis of epididymitis\par \par His anxiety medication was changed and this resulted in resolution of his orgasmic dysfunction\par \par normal erections\par \par no other issues with sensations to feet or hands\par \par On testosterone replacement with doctor in [place] for low drive\par currently 1050 \par no injury during sex \par no stretching devices or constrictions rings \par no history of diabetes\par started vilazodone for anxiety around the same time \par \par unable to reach orgasm consistently\par last time a week ago\par \par same partner for 5 years \par \par PE - normal penis, testes without tenderness -- right testes retracted - easily milked down - no masses bilaterally. \par \par

## 2023-02-23 NOTE — HISTORY OF PRESENT ILLNESS
[Home] : at home, [unfilled] , at the time of the visit. [Medical Office: ([facility])___] : at the medical office located in  [Verbal consent obtained from patient] : the patient, [unfilled] [FreeTextEntry1] : Telephonic visit -- [facility] FOR FOLLOW UP APPOINTMENT\par \par The patient-doctor relationship has been established in an audio HIPAA compliant communication using telemedicine software. The patient's identity has been confirmed. The patient was previously emailed a copy of the consent. They have had a chance to review and has now given verbal consent and has requested care to be assessed and treated through telephone and understands there maybe limitations in this process and they may need further follow up care in the office and or hospital settings.\par \par The patient denies fevers, chills, nausea and or vomiting and no unexplained weight loss.\par \par All pertinent parts of the patient PFSH (past medical, family and social histories), laboratory, radiological studies and physician notes were reviewed prior to starting the visit. Questionnaire results were discussed with patient.\par \par ==================================================================================================== \par \par This is a 57 year male who presents with decreased orgasm/ejaculation\par \par May 2022 - sepsis UTI\par was on cipro PO for three days\par \par Pt reports loss of sensitivity/numbness worse after had sepsis UTI in May 222\par hospitalized for Sepsis/ new diagnosis of epididymitis\par \par His anxiety medication was changed and this resulted in resolution of his orgasmic dysfunction\par \par normal erections\par \par no other issues with sensations to feet or hands\par \par On testosterone replacement with doctor in [place] for low drive\par currently 1050 \par no injury during sex \par no stretching devices or constrictions rings \par no history of diabetes\par started vilazodone for anxiety around the same time \par \par unable to reach orgasm consistently\par last time a week ago\par \par same partner for 5 years \par \par PE - normal penis, testes without tenderness -- right testes retracted - easily milked down - no masses bilaterally. \par \par \par \par [phone]\par [email or web address]

## 2024-04-16 NOTE — ED ADULT TRIAGE NOTE - BP NONINVASIVE DIASTOLIC (MM HG)
Meagan Argutea MD   87

## 2024-11-05 NOTE — PATIENT PROFILE ADULT. - FUNCTIONAL SCREEN CURRENT LEVEL: AMBULATION, MLM
